# Patient Record
Sex: MALE | Race: BLACK OR AFRICAN AMERICAN | URBAN - METROPOLITAN AREA
[De-identification: names, ages, dates, MRNs, and addresses within clinical notes are randomized per-mention and may not be internally consistent; named-entity substitution may affect disease eponyms.]

---

## 2021-12-21 ENCOUNTER — OFFICE VISIT (OUTPATIENT)
Dept: URBAN - METROPOLITAN AREA CLINIC 17 | Facility: CLINIC | Age: 51
End: 2021-12-21

## 2022-01-18 ENCOUNTER — LAB OUTSIDE AN ENCOUNTER (OUTPATIENT)
Dept: URBAN - METROPOLITAN AREA CLINIC 17 | Facility: CLINIC | Age: 52
End: 2022-01-18

## 2022-01-18 ENCOUNTER — OFFICE VISIT (OUTPATIENT)
Dept: URBAN - METROPOLITAN AREA CLINIC 17 | Facility: CLINIC | Age: 52
End: 2022-01-18
Payer: COMMERCIAL

## 2022-01-18 ENCOUNTER — WEB ENCOUNTER (OUTPATIENT)
Dept: URBAN - METROPOLITAN AREA CLINIC 17 | Facility: CLINIC | Age: 52
End: 2022-01-18

## 2022-01-18 VITALS
HEART RATE: 59 BPM | DIASTOLIC BLOOD PRESSURE: 69 MMHG | WEIGHT: 236.6 LBS | BODY MASS INDEX: 30.36 KG/M2 | HEIGHT: 74 IN | TEMPERATURE: 97.1 F | SYSTOLIC BLOOD PRESSURE: 113 MMHG

## 2022-01-18 DIAGNOSIS — K62.5 RECTAL BLEEDING: ICD-10-CM

## 2022-01-18 DIAGNOSIS — E66.9 OBESITY (BMI 30-39.9): ICD-10-CM

## 2022-01-18 DIAGNOSIS — D50.0 IRON DEFICIENCY ANEMIA DUE TO CHRONIC BLOOD LOSS: ICD-10-CM

## 2022-01-18 PROCEDURE — 99244 OFF/OP CNSLTJ NEW/EST MOD 40: CPT | Performed by: INTERNAL MEDICINE

## 2022-01-18 PROCEDURE — 99204 OFFICE O/P NEW MOD 45 MIN: CPT | Performed by: INTERNAL MEDICINE

## 2022-01-18 RX ORDER — SODIUM, POTASSIUM,MAG SULFATES 17.5-3.13G
177ML SOLUTION, RECONSTITUTED, ORAL ORAL AS DIRECTED
Qty: 1 KIT | Refills: 0 | OUTPATIENT
Start: 2022-01-18 | End: 2022-01-19

## 2022-01-18 NOTE — EXAM-PHYSICAL EXAM
Gen: Alert, oriented, in no distress Heent: no icterus, lips wnl Lungs: bilateral breath sounds CVS: first and second heart sounds Abdomen: full, soft, non tender Ext: no edema Joints: normal joints and symmetry Spine: consistent with age Skin: no rash on exposed areas Neuro: no focal localizing signs
nonspecific chest pain  labs ekg and chest xray. low heart score

## 2022-01-18 NOTE — HPI-TODAY'S VISIT:
1/18/22 52 yo male pt of Dr Bigg Bello. Referred for w/u of anemia. A copy of this note will be sent to him. He had a colonoscopy about 5 years ago for rectal bleeding. He had hemorrhoid banding says it didnt work. Was sent for surgery. Got laid off 2 years ago and didnt have surgery.  He needs w/u for anemia is the reason he was referred he tells me. WE have no records and he does not know his Hb level. It was last checked in 10/2021. He has intermittent rectal bleeding > once a week.  Usually blood is mixed in stool. He thinks is "1/2 blood half stool, sometimes little, sometimes a lot". He is not constipated.  Has a BM not daily but 3-5 times a week. No fhx of colon CA.  He has no abdominal pain/reflux/nausea/vomiting.

## 2022-01-19 ENCOUNTER — TELEPHONE ENCOUNTER (OUTPATIENT)
Dept: URBAN - METROPOLITAN AREA CLINIC 92 | Facility: CLINIC | Age: 52
End: 2022-01-19

## 2022-01-19 LAB
FERRITIN, SERUM: 15
HEMATOCRIT: 35.4
HEMOGLOBIN: 11
IRON BIND.CAP.(TIBC): 428
IRON SATURATION: 6
IRON: 25
MCH: 25.1
MCHC: 31.1
MCV: 81
NRBC: (no result)
PLATELETS: 190
RBC: 4.39
RDW: 15.4
UIBC: 403
WBC: 4.7

## 2022-01-19 RX ORDER — POLYETHYLENE GLYCOL-3350, SODIUM CHLORIDE, POTASSIUM CHLORIDE AND SODIUM BICARBONATE 420; 11.2; 5.72; 1.48 G/438.4G; G/438.4G; G/438.4G; G/438.4G
AS DIRECTED POWDER, FOR SOLUTION ORAL ONCE
Qty: 1 KIT | Refills: 0 | OUTPATIENT
Start: 2022-01-19 | End: 2022-01-20

## 2022-01-24 ENCOUNTER — OFFICE VISIT (OUTPATIENT)
Dept: URBAN - METROPOLITAN AREA SURGERY CENTER 16 | Facility: SURGERY CENTER | Age: 52
End: 2022-01-24
Payer: COMMERCIAL

## 2022-01-24 DIAGNOSIS — K62.5 ANAL BLEEDING: ICD-10-CM

## 2022-01-24 PROCEDURE — 45378 DIAGNOSTIC COLONOSCOPY: CPT | Performed by: INTERNAL MEDICINE

## 2022-01-24 PROCEDURE — G8907 PT DOC NO EVENTS ON DISCHARG: HCPCS | Performed by: INTERNAL MEDICINE

## 2022-02-24 ENCOUNTER — OFFICE VISIT (OUTPATIENT)
Dept: URBAN - METROPOLITAN AREA CLINIC 17 | Facility: CLINIC | Age: 52
End: 2022-02-24
Payer: COMMERCIAL

## 2022-02-24 VITALS
HEIGHT: 74 IN | HEART RATE: 66 BPM | TEMPERATURE: 97.5 F | BODY MASS INDEX: 30.06 KG/M2 | SYSTOLIC BLOOD PRESSURE: 110 MMHG | WEIGHT: 234.2 LBS | DIASTOLIC BLOOD PRESSURE: 67 MMHG

## 2022-02-24 DIAGNOSIS — E66.9 OBESITY (BMI 30-39.9): ICD-10-CM

## 2022-02-24 DIAGNOSIS — K62.5 RECTAL BLEEDING: ICD-10-CM

## 2022-02-24 DIAGNOSIS — D50.0 IRON DEFICIENCY ANEMIA DUE TO CHRONIC BLOOD LOSS: ICD-10-CM

## 2022-02-24 DIAGNOSIS — K59.09 CHRONIC CONSTIPATION: ICD-10-CM

## 2022-02-24 PROCEDURE — 99213 OFFICE O/P EST LOW 20 MIN: CPT | Performed by: INTERNAL MEDICINE

## 2022-02-24 RX ORDER — HYDROCORTISONE ACETATE 25 MG/1
1 SUPPOSITORY SUPPOSITORY RECTAL
Qty: 20 | Refills: 0 | OUTPATIENT
Start: 2022-02-24 | End: 2022-03-06

## 2022-02-24 NOTE — HPI-TODAY'S VISIT:
1/18/22 50 yo male pt of Dr Bigg Bello. Referred for w/u of anemia. A copy of this note will be sent to him. He had a colonoscopy about 5 years ago for rectal bleeding. He had hemorrhoid banding says it didnt work. Was sent for surgery. Got laid off 2 years ago and didnt have surgery.  He needs w/u for anemia is the reason he was referred he tells me. WE have no records and he does not know his Hb level. It was last checked in 10/2021. He has intermittent rectal bleeding > once a week.  Usually blood is mixed in stool. He thinks is "1/2 blood half stool, sometimes little, sometimes a lot". He is not constipated.  Has a BM not daily but 3-5 times a week. No fhx of colon CA.  He has no abdominal pain/reflux/nausea/vomiting.   2/24/22 Here for f.u visit STil having blood in stool. No other GI symptoms. Discussed hemorrhoid banding - says he has tried banding twice in [place] and "it didnt work". Is not willing to retry. Wants to be sent to surgery.

## 2022-03-02 ENCOUNTER — TELEPHONE ENCOUNTER (OUTPATIENT)
Dept: URBAN - METROPOLITAN AREA CLINIC 40 | Facility: CLINIC | Age: 52
End: 2022-03-02

## 2022-03-03 ENCOUNTER — OFFICE VISIT (OUTPATIENT)
Dept: URBAN - METROPOLITAN AREA CLINIC 17 | Facility: CLINIC | Age: 52
End: 2022-03-03
Payer: COMMERCIAL

## 2022-03-03 DIAGNOSIS — E66.9 OBESITY (BMI 30-39.9): ICD-10-CM

## 2022-03-03 DIAGNOSIS — K59.09 CHRONIC CONSTIPATION: ICD-10-CM

## 2022-03-03 DIAGNOSIS — K62.5 RECTAL BLEEDING: ICD-10-CM

## 2022-03-03 DIAGNOSIS — K64.1 GRADE II HEMORRHOIDS: ICD-10-CM

## 2022-03-03 DIAGNOSIS — D50.0 IRON DEFICIENCY ANEMIA DUE TO CHRONIC BLOOD LOSS: ICD-10-CM

## 2022-03-03 PROBLEM — 162864005: Status: ACTIVE | Noted: 2022-01-18

## 2022-03-03 PROBLEM — 724556004: Status: ACTIVE | Noted: 2022-01-18

## 2022-03-03 PROCEDURE — 46261 REMOVE IN/EX HEM GRPS & FISS: CPT | Performed by: INTERNAL MEDICINE

## 2022-03-03 PROCEDURE — 99213 OFFICE O/P EST LOW 20 MIN: CPT | Performed by: INTERNAL MEDICINE

## 2022-03-03 RX ORDER — HYDROCORTISONE ACETATE 25 MG/1
1 SUPPOSITORY SUPPOSITORY RECTAL
Qty: 20 | Refills: 0 | Status: ACTIVE | COMMUNITY
Start: 2022-02-24 | End: 2022-03-06

## 2022-03-03 RX ORDER — HYDROCORTISONE ACETATE 25 MG/1
1 SUPPOSITORY SUPPOSITORY RECTAL
Qty: 20 | Refills: 0 | OUTPATIENT

## 2022-03-03 NOTE — HPI-TODAY'S VISIT:
1/18/22 50 yo male pt of Dr Bigg Bello. Referred for w/u of anemia. A copy of this note will be sent to him. He had a colonoscopy about 5 years ago for rectal bleeding. He had hemorrhoid banding says it didnt work. Was sent for surgery. Got laid off 2 years ago and didnt have surgery.  He needs w/u for anemia is the reason he was referred he tells me. WE have no records and he does not know his Hb level. It was last checked in 10/2021. He has intermittent rectal bleeding > once a week.  Usually blood is mixed in stool. He thinks is "1/2 blood half stool, sometimes little, sometimes a lot". He is not constipated.  Has a BM not daily but 3-5 times a week. No fhx of colon CA.  He has no abdominal pain/reflux/nausea/vomiting.   2/24/22 Here for f.u visit STil having blood in stool. No other GI symptoms. Discussed hemorrhoid banding - says he has tried banding twice in [place] and "it didnt work". Is not willing to retry. Wants to be sent to surgery.   3/3/22 Discussed hemorrhoid banding. Answered questions.

## 2022-03-14 ENCOUNTER — TELEPHONE ENCOUNTER (OUTPATIENT)
Dept: URBAN - METROPOLITAN AREA CLINIC 124 | Facility: CLINIC | Age: 52
End: 2022-03-14

## 2022-03-31 ENCOUNTER — OFFICE VISIT (OUTPATIENT)
Dept: URBAN - METROPOLITAN AREA CLINIC 17 | Facility: CLINIC | Age: 52
End: 2022-03-31

## 2022-03-31 RX ORDER — NITROGLYCERIN 20 MG/G
OINTMENT TOPICAL
Qty: 60 | Status: ACTIVE | COMMUNITY

## 2022-03-31 RX ORDER — HYDROCORTISONE ACETATE 25 MG/1
1 SUPPOSITORY SUPPOSITORY RECTAL
Qty: 20 | Refills: 0 | Status: ACTIVE | COMMUNITY

## 2022-05-16 ENCOUNTER — DASHBOARD ENCOUNTERS (OUTPATIENT)
Age: 52
End: 2022-05-16

## 2022-06-02 ENCOUNTER — OFFICE VISIT (OUTPATIENT)
Dept: URBAN - METROPOLITAN AREA CLINIC 17 | Facility: CLINIC | Age: 52
End: 2022-06-02

## 2022-06-02 RX ORDER — NITROGLYCERIN 20 MG/G
OINTMENT TOPICAL
Qty: 60 | Status: ACTIVE | COMMUNITY

## 2022-06-02 RX ORDER — HYDROCORTISONE ACETATE 25 MG/1
1 SUPPOSITORY SUPPOSITORY RECTAL
Qty: 20 | Refills: 0 | Status: ACTIVE | COMMUNITY